# Patient Record
Sex: FEMALE | Race: BLACK OR AFRICAN AMERICAN | Employment: UNEMPLOYED | URBAN - METROPOLITAN AREA
[De-identification: names, ages, dates, MRNs, and addresses within clinical notes are randomized per-mention and may not be internally consistent; named-entity substitution may affect disease eponyms.]

---

## 2017-02-22 ENCOUNTER — OP HISTORICAL/CONVERTED ENCOUNTER (OUTPATIENT)
Dept: OTHER | Age: 72
End: 2017-02-22

## 2018-03-11 ENCOUNTER — ED HISTORICAL/CONVERTED ENCOUNTER (OUTPATIENT)
Dept: OTHER | Age: 73
End: 2018-03-11

## 2019-04-19 ENCOUNTER — ED HISTORICAL/CONVERTED ENCOUNTER (OUTPATIENT)
Dept: OTHER | Age: 74
End: 2019-04-19

## 2020-05-01 ENCOUNTER — ED HISTORICAL/CONVERTED ENCOUNTER (OUTPATIENT)
Dept: OTHER | Age: 75
End: 2020-05-01

## 2022-01-14 ENCOUNTER — HOSPITAL ENCOUNTER (EMERGENCY)
Age: 77
Discharge: HOME | End: 2022-01-14
Attending: EMERGENCY MEDICINE
Payer: MEDICARE

## 2022-01-14 VITALS
TEMPERATURE: 98.5 F | BODY MASS INDEX: 35.44 KG/M2 | RESPIRATION RATE: 18 BRPM | DIASTOLIC BLOOD PRESSURE: 85 MMHG | HEIGHT: 63 IN | WEIGHT: 200 LBS | OXYGEN SATURATION: 99 % | HEART RATE: 62 BPM | SYSTOLIC BLOOD PRESSURE: 140 MMHG

## 2022-01-14 DIAGNOSIS — S16.1XXA STRAIN OF NECK MUSCLE, INITIAL ENCOUNTER: Primary | ICD-10-CM

## 2022-01-14 PROCEDURE — 99284 EMERGENCY DEPT VISIT MOD MDM: CPT

## 2022-01-14 PROCEDURE — 74011000250 HC RX REV CODE- 250: Performed by: EMERGENCY MEDICINE

## 2022-01-14 PROCEDURE — 74011250637 HC RX REV CODE- 250/637: Performed by: EMERGENCY MEDICINE

## 2022-01-14 RX ORDER — IBUPROFEN 800 MG/1
800 TABLET ORAL ONCE
Status: COMPLETED | OUTPATIENT
Start: 2022-01-14 | End: 2022-01-14

## 2022-01-14 RX ORDER — EZETIMIBE 10 MG/1
TABLET ORAL
COMMUNITY

## 2022-01-14 RX ORDER — SIMVASTATIN 10 MG/1
TABLET, FILM COATED ORAL
COMMUNITY

## 2022-01-14 RX ORDER — NAPROXEN 500 MG/1
500 TABLET ORAL 2 TIMES DAILY WITH MEALS
Qty: 20 TABLET | Refills: 0 | Status: SHIPPED | OUTPATIENT
Start: 2022-01-14 | End: 2022-01-24

## 2022-01-14 RX ORDER — LIDOCAINE 4 G/100G
1 PATCH TOPICAL EVERY 24 HOURS
Status: DISCONTINUED | OUTPATIENT
Start: 2022-01-14 | End: 2022-01-14 | Stop reason: HOSPADM

## 2022-01-14 RX ORDER — CETIRIZINE HCL 10 MG
TABLET ORAL
COMMUNITY

## 2022-01-14 RX ADMIN — IBUPROFEN 800 MG: 800 TABLET ORAL at 18:22

## 2022-01-14 NOTE — ED PROVIDER NOTES
EMERGENCY DEPARTMENT HISTORY AND PHYSICAL EXAM      Date: 1/14/2022  Patient Name: Marcelino Scott    History of Presenting Illness     Chief Complaint   Patient presents with    Neck Pain       History Provided By: Patient    HPI: Marcelino Scott, 78 y.o. female with a past medical history significant hypertension and hyperlipidemia presents to the ED with cc of left-sided neck pain. Patient states the last few days she has noticed some sinus pressure and nasal congestion. She states in the past she has been told that if this happens she need a  antibiotic. She has noticed for the last 2 days with a left lateral neck pain that radiates down to her shoulder. The pain feels like a muscle strain. She has not noticed any neurovascular changes or weakness. Tried a dose of Tylenol with some improvement. Patient does do some heavy lifting at work. She tried heat with some improvement last night. She denies any falls or trauma. There are no other complaints, changes, or physical findings at this time. PCP: Conor Gale NP    No current facility-administered medications on file prior to encounter. Current Outpatient Medications on File Prior to Encounter   Medication Sig Dispense Refill    cetirizine (ZYRTEC) 10 mg tablet Take  by mouth.  simvastatin (ZOCOR) 10 mg tablet Take  by mouth nightly.  ezetimibe (Zetia) 10 mg tablet Take  by mouth. Past History     Past Medical History:  Past Medical History:   Diagnosis Date    High cholesterol     Hypertension        Past Surgical History:  History reviewed. No pertinent surgical history. Family History:  History reviewed. No pertinent family history. Social History:  Social History     Tobacco Use    Smoking status: Never Smoker    Smokeless tobacco: Never Used   Substance Use Topics    Alcohol use: Not on file    Drug use: Not on file       Allergies:   Allergies   Allergen Reactions    Tramadol Nausea and Vomiting Review of Systems     Review of Systems   Constitutional: Negative for chills. HENT: Positive for congestion. Respiratory: Negative for cough. Cardiovascular: Negative for chest pain. Gastrointestinal: Negative for constipation, diarrhea and vomiting. Musculoskeletal: Positive for neck pain. Negative for back pain. Skin: Negative for rash. Neurological: Negative for headaches. Psychiatric/Behavioral: The patient is not nervous/anxious. Physical Exam     Physical Exam  Vitals and nursing note reviewed. Constitutional:       Appearance: Normal appearance. She is normal weight. HENT:      Head: Normocephalic and atraumatic. Right Ear: Tympanic membrane normal.      Left Ear: Tympanic membrane normal.      Nose: Nose normal.      Mouth/Throat:      Mouth: Mucous membranes are moist.   Eyes:      Conjunctiva/sclera: Conjunctivae normal.   Neck:      Comments: No point spinal tenderness. Mild tenderness to palpation of the left lateral paraspinal muscles to the shoulder. No meningismus. Full range of motion. Cardiovascular:      Rate and Rhythm: Normal rate. Pulses: Normal pulses. Pulmonary:      Effort: Pulmonary effort is normal. No respiratory distress. Musculoskeletal:         General: No swelling or deformity. Normal range of motion. Skin:     General: Skin is warm and dry. Findings: No rash. Neurological:      General: No focal deficit present. Mental Status: She is alert. Psychiatric:         Mood and Affect: Mood normal.         Behavior: Behavior normal.         Lab and Diagnostic Study Results     Labs -   No results found for this or any previous visit (from the past 12 hour(s)). Radiologic Studies -   @lastxrresult@  CT Results  (Last 48 hours)    None        CXR Results  (Last 48 hours)    None            Medical Decision Making   - I am the first provider for this patient.     - I reviewed the vital signs, available nursing notes, past medical history, past surgical history, family history and social history. - Initial assessment performed. The patients presenting problems have been discussed, and they are in agreement with the care plan formulated and outlined with them. I have encouraged them to ask questions as they arise throughout their visit. Vital Signs-Reviewed the patient's vital signs. Patient Vitals for the past 12 hrs:   Temp Pulse Resp BP SpO2   01/14/22 1609 98.5 °F (36.9 °C)       01/14/22 1608  62 18 (!) 140/85 99 %       Records Reviewed: Nursing Notes      ED Course:        62-year-old female presents for evaluation of left-sided neck pain. Of note patient has had some URI symptoms over the last 2 weeks which are resolving. On exam she is well-appearing. TMs are normal.  This is a mild nasal congestion. This is most likely related to a resolving viral illness. For the last few days she has had some left lateral neck pain consistent with a cervical strain. She has full range of motion without meningismus. Does have some mild paraspinal tenderness to palpation but no point spinal tenderness and no history of trauma. We will treat symptomatically with lidocaine patch, naproxen and have patient follow-up with her PCP. Patient states her PCP recently retired so I gave her contact information for several different primary care physicians to call to get an appointment. Patient understands and agrees with this plan. Discussed return cautions. Discharged home. .        Disposition   Disposition: DC- Adult Discharges: All of the diagnostic tests were reviewed and questions answered. Diagnosis, care plan and treatment options were discussed. The patient understands the instructions and will follow up as directed. The patients results have been reviewed with them. They have been counseled regarding their diagnosis.   The patient verbally convey understanding and agreement of the signs, symptoms, diagnosis, treatment and prognosis and additionally agrees to follow up as recommended with their PCP in 24 - 48 hours. They also agree with the care-plan and convey that all of their questions have been answered. I have also put together some discharge instructions for them that include: 1) educational information regarding their diagnosis, 2) how to care for their diagnosis at home, as well a 3) list of reasons why they would want to return to the ED prior to their follow-up appointment, should their condition change. Discharged    DISCHARGE PLAN:  1. Current Discharge Medication List      CONTINUE these medications which have NOT CHANGED    Details   cetirizine (ZYRTEC) 10 mg tablet Take  by mouth. simvastatin (ZOCOR) 10 mg tablet Take  by mouth nightly.      ezetimibe (Zetia) 10 mg tablet Take  by mouth. 2.   Follow-up Information     Follow up With Specialties Details Why Contact Info    Emanuel Amador MD Family Medicine  As needed [address]  [phone]      Minal Wadsworth NP Nurse Practitioner  As needed [address] [facility]  [phone]      [address]   As needed [address] [address]  [phone]    [address] [facility]  As needed [address] [facility]  [phone]        3. Return to ED if worse   4. Current Discharge Medication List      START taking these medications    Details   naproxen (Naprosyn) 500 mg tablet Take 1 Tablet by mouth two (2) times daily (with meals) for 10 days. Qty: 20 Tablet, Refills: 0  Start date: 1/14/2022, End date: 1/24/2022               Diagnosis     Clinical Impression:   1. Strain of neck muscle, initial encounter        Attestations:    Rashawn Keating MD    Please note that this dictation was completed with [employer], the [employer] voice recognition software.   Quite often unanticipated grammatical, syntax, homophones, and other interpretive errors are inadvertently transcribed by the computer software. Please disregard these errors. Please excuse any errors that have escaped final proofreading. Thank you.

## 2022-01-14 NOTE — ED NOTES
Pt discharge at this time. D/C instructions reviewed by this nurse and Pt verbalized understanding. Medications reviewed and pharmacy confirmed. Answers all questions to the best of my ability.

## 2022-01-14 NOTE — Clinical Note
[address] EMERGENCY DEPARTMENT  [address] [facility] [address]  [phone]    Work/School Note    Date: 1/14/2022    To Whom It May concern:    Candie Lares was seen and treated today in the emergency room by the following provider(s):  Attending Provider: Nanda Scott MD.      Candie Lares is excused from work/school on 1/14/2022 through 1/16/2022. She is medically clear to return to work/school on 1/17/2022.          Sincerely,          Daylin Beal MD

## 2023-01-19 ENCOUNTER — HOSPITAL ENCOUNTER (EMERGENCY)
Age: 78
Discharge: HOME | End: 2023-01-19
Payer: MEDICARE

## 2023-01-19 ENCOUNTER — APPOINTMENT (OUTPATIENT)
Dept: GENERAL RADIOLOGY | Age: 78
End: 2023-01-19
Attending: EMERGENCY MEDICINE
Payer: MEDICARE

## 2023-01-19 VITALS
HEIGHT: 63 IN | TEMPERATURE: 98.4 F | OXYGEN SATURATION: 100 % | BODY MASS INDEX: 35.44 KG/M2 | DIASTOLIC BLOOD PRESSURE: 70 MMHG | WEIGHT: 200 LBS | HEART RATE: 66 BPM | SYSTOLIC BLOOD PRESSURE: 132 MMHG | RESPIRATION RATE: 18 BRPM

## 2023-01-19 DIAGNOSIS — M25.532 LEFT WRIST PAIN: Primary | ICD-10-CM

## 2023-01-19 DIAGNOSIS — M19.042 OSTEOARTHRITIS OF LEFT HAND, UNSPECIFIED OSTEOARTHRITIS TYPE: ICD-10-CM

## 2023-01-19 PROCEDURE — 99283 EMERGENCY DEPT VISIT LOW MDM: CPT

## 2023-01-19 PROCEDURE — 73110 X-RAY EXAM OF WRIST: CPT

## 2023-01-19 PROCEDURE — 74011636637 HC RX REV CODE- 636/637: Performed by: NURSE PRACTITIONER

## 2023-01-19 RX ORDER — METHYLPREDNISOLONE 4 MG/1
TABLET ORAL
Qty: 1 DOSE PACK | Refills: 0 | Status: SHIPPED | OUTPATIENT
Start: 2023-01-19

## 2023-01-19 RX ORDER — ACETAMINOPHEN 500 MG
1000 TABLET ORAL
Qty: 20 TABLET | Refills: 0 | Status: SHIPPED | OUTPATIENT
Start: 2023-01-19 | End: 2023-01-19 | Stop reason: SDUPTHER

## 2023-01-19 RX ORDER — ACETAMINOPHEN 500 MG
1000 TABLET ORAL
Qty: 20 TABLET | Refills: 0 | Status: SHIPPED | OUTPATIENT
Start: 2023-01-19

## 2023-01-19 RX ORDER — METHYLPREDNISOLONE 4 MG/1
TABLET ORAL
Qty: 1 DOSE PACK | Refills: 0 | Status: SHIPPED | OUTPATIENT
Start: 2023-01-19 | End: 2023-01-19 | Stop reason: SDUPTHER

## 2023-01-19 RX ORDER — PREDNISONE 20 MG/1
60 TABLET ORAL
Status: COMPLETED | OUTPATIENT
Start: 2023-01-19 | End: 2023-01-19

## 2023-01-19 RX ADMIN — PREDNISONE 60 MG: 20 TABLET ORAL at 18:19

## 2023-01-19 NOTE — ED PROVIDER NOTES
[facility] EMERGENCY DEPARTMENT  EMERGENCY DEPARTMENT HISTORY AND PHYSICAL EXAM      Date: 1/19/2023  Patient Name: Garrison Quiñonez  MRN: [id number]  [place]: 1945  Date of evaluation: 1/19/2023  Provider: Armando Granger NP   Note Started: 6:09 PM 1/19/23    HISTORY OF PRESENT ILLNESS     Chief Complaint   Patient presents with    Hand Pain       History Provided By: Patient    HPI: Garrison Quiñonez, 78 y.o. female htn, hl complains of left wrist pain and swelling for the past week. She reports use of Tylenol Motrin with no improvement in symptoms. Reports aggravating factors include movement and palpation. Works as a CNA. She denies any known injury, trauma, fever, chills, erythema, warmth, drainage laceration or abrasion numbness or tingling. PAST MEDICAL HISTORY   Past Medical History:  Past Medical History:   Diagnosis Date    High cholesterol     Hypertension        Past Surgical History:  No past surgical history on file. Family History:  History reviewed. No pertinent family history. Social History:  Social History     Tobacco Use    Smoking status: Never    Smokeless tobacco: Never       Allergies: Allergies   Allergen Reactions    Tramadol Nausea and Vomiting       PCP: Malick Quarles NP    Current Meds:   Previous Medications    CETIRIZINE (ZYRTEC) 10 MG TABLET    Take  by mouth. EZETIMIBE (ZETIA) 10 MG TABLET    Take  by mouth. SIMVASTATIN (ZOCOR) 10 MG TABLET    Take  by mouth nightly. REVIEW OF SYSTEMS   Review of Systems   Constitutional:  Negative for chills and fever. Musculoskeletal:  Positive for arthralgias and joint swelling. Skin:  Negative for color change, rash and wound. Neurological:  Negative for weakness and numbness. All other systems reviewed and are negative. Positives and Pertinent negatives as per HPI.     PHYSICAL EXAM     ED Triage Vitals [01/19/23 1637]   ED Encounter Vitals Group      /70      Pulse (Heart Rate) 66      Resp Rate 18 Temp 98.4 °F (36.9 °C)      Temp src       O2 Sat (%) 100 %      Weight 200 lb      Height 5' 3\"      Physical Exam  Vitals and nursing note reviewed. Constitutional:       General: She is not in acute distress. Appearance: Normal appearance. She is normal weight. She is not ill-appearing or toxic-appearing. HENT:      Head: Normocephalic and atraumatic. Nose: Nose normal.      Mouth/Throat:      Mouth: Mucous membranes are moist.   Eyes:      Extraocular Movements: Extraocular movements intact. Cardiovascular:      Rate and Rhythm: Normal rate and regular rhythm. Pulses: Normal pulses. Pulmonary:      Effort: Pulmonary effort is normal. No respiratory distress. Musculoskeletal:         General: Swelling and tenderness present. Left wrist: Swelling and tenderness present. No bony tenderness or snuff box tenderness. Decreased range of motion. Normal pulse. Cervical back: Normal range of motion and neck supple. No rigidity. Comments: 2+ distal pulses, less than 2-second capillary refill, positive sensation bilaterally. No erythema edema warmth drainage or laceration noted to site. Mild edema noted to left wrist with tenderness to palpation and flexion and extension of left wrist.  Patient neurovascularly intact   Skin:     General: Skin is warm and dry. Capillary Refill: Capillary refill takes less than 2 seconds. Neurological:      Mental Status: She is alert and oriented to person, place, and time. SCREENINGS               No data recorded        LAB, EKG AND DIAGNOSTIC RESULTS       Radiologic Studies:      Interpretation per the Radiologist below, if available at the time of this note:  XR WRIST LT AP/LAT/OBL MIN 3V    Result Date: 1/19/2023  EXAM: XR WRIST LT AP/LAT/OBL MIN 3V INDICATION:  left wrist pain COMPARISON: None. FINDINGS: Three views of the left wrist demonstrate diffusely decreased bone mineralization.  There is no evident fracture or other osseous, articular or soft tissue abnormality. Osteoarthritic changes are seen most severe at the base of the thumb. No fracture. Diffuse osteopenia and osteoarthritis. Consider follow-up or CT for continue/persistent clinical concern. PROCEDURES   Unless otherwise noted below, none. Performed by: Shamir Art NP   Procedures      CRITICAL CARE TIME       ED COURSE and DIFFERENTIAL DIAGNOSIS/MDM   Vitals:    Vitals:    01/19/23 1637   BP: 132/70   Pulse: 66   Resp: 18   Temp: 98.4 °F (36.9 °C)   SpO2: 100%   Weight: 90.7 kg (200 lb)   Height: 5' 3\" (1.6 m)        Patient was given the following medications:  Medications   predniSONE (DELTASONE) tablet 60 mg (has no administration in time range)     79-year-old female patient complains of left hand pain. Vital signs shows patient is afebrile not tachycardic SPO2 100% on room air. And physical exam grossly negative except for mild swelling decreased ROM to left wrist.  According to HPI differential diagnoses include septic arthritis, OA, gout, joint effusion, sprain. X-ray shows no acute findings however patient with osteopenia and osteoporosis. She reports no improvement status post Tylenol took some ibuprofen however concerned about use at this time. Patient with decreased ROM and tenderness with flexion and extension of left wrist trace edema noted. We will start patient on short course of p.o. prednisone advised to follow-up with orthopedic and PCP supportive care of Ace wrap given for stability and support for gout due to no erythema or pain out of proportion noted on exam denies any injury or trauma no sprain no joint effusion noted on exam patient is afebrile not tachycardic no warmth erythema or drainage noted low concern for septic arthritis. Patient advised signs symptoms to return to emergency department verbalized understanding stable at time of discharge    FINAL IMPRESSION     1. Left wrist pain    2.  Osteoarthritis of left hand, unspecified osteoarthritis type          DISPOSITION/PLAN   Discharged    Discharge Note: The patient is stable for discharge home. The signs, symptoms, diagnosis, and discharge instructions have been discussed, understanding conveyed, and agreed upon. The patient is to follow up as recommended or return to ER should their symptoms worsen. PATIENT REFERRED TO:  Follow-up Information       Follow up With Specialties Details Why Contact Info    Aydee Bravo NP Nurse Practitioner Schedule an appointment as soon as possible for a visit in 2 days If symptoms worsen, As needed [address] [facility]      Katie Verma MD Orthopedic Surgery Schedule an appointment as soon as possible for a visit in 1 week If symptoms worsen, As needed [facility]  [facility]  [phone]                DISCHARGE MEDICATIONS:  Current Discharge Medication List        START taking these medications    Details   methylPREDNISolone (Medrol, Pa,) 4 mg tablet Take as directed  Qty: 1 Dose Pack, Refills: 0  Start date: 1/19/2023      acetaminophen (Acetaminophen Extra Strength) 500 mg tablet Take 2 Tablets by mouth every six (6) hours as needed for Pain or Fever. Qty: 20 Tablet, Refills: 0  Start date: 1/19/2023               DISCONTINUED MEDICATIONS:  Current Discharge Medication List          I am the Primary Clinician of Record: Kandy Schilling NP (electronically signed)    (Please note that parts of this dictation were completed with voice recognition software. Quite often unanticipated grammatical, syntax, homophones, and other interpretive errors are inadvertently transcribed by the computer software. Please disregards these errors.  Please excuse any errors that have escaped final proofreading.)

## 2023-01-19 NOTE — DISCHARGE INSTRUCTIONS
Thank you! Thank you for allowing me to care for you in the emergency department. It is my goal to provide you with excellent care. If you have not received excellent quality care, please ask to speak to the nurse manager. Please fill out the survey that will come to you by mail or email since we listen to your feedback! Below you will find a list of your tests from today's visit. Should you have any questions, please do not hesitate to call the emergency department. Labs  No results found for this or any previous visit (from the past 12 hour(s)). Radiologic Studies  XR WRIST LT AP/LAT/OBL MIN 3V   Final Result   No fracture. Diffuse osteopenia and osteoarthritis. Consider   follow-up or CT for continue/persistent clinical concern. CT Results  (Last 48 hours)      None          CXR Results  (Last 48 hours)      None          ------------------------------------------------------------------------------------------------------------  The exam and treatment you received in the Emergency Department were for an urgent problem and are not intended as complete care. It is important that you follow-up with a doctor, nurse practitioner, or physician assistant to:  (1) confirm your diagnosis,  (2) re-evaluation of changes in your illness and treatment, and  (3) for ongoing care. Please take your discharge instructions with you when you go to your follow-up appointment. If you have any problem arranging a follow-up appointment, contact the Emergency Department. If your symptoms become worse or you do not improve as expected and you are unable to reach your health care provider, please return to the Emergency Department. We are available 24 hours a day. If a prescription has been provided, please have it filled as soon as possible to prevent a delay in treatment.  If you have any questions or reservations about taking the medication due to side effects or interactions with other medications, please call your primary care provider or contact the ER.

## 2023-01-19 NOTE — Clinical Note
[address] EMERGENCY DEPARTMENT  [address] [facility] [address]  [phone]    Work/School Note    Date: 1/19/2023    To Whom It May concern:      Kiki Seals was seen and treated today in the emergency room by the following provider(s):  Nurse Practitioner: Guadlupe Severs, NP. Kiki Seals is excused from work/school on 01/19/23. She is clear to return to work/school on 01/20/23.         Sincerely,          Yi De La Cruz NP

## 2023-08-28 ENCOUNTER — HOSPITAL ENCOUNTER (EMERGENCY)
Facility: HOSPITAL | Age: 78
Discharge: HOME | End: 2023-08-28
Attending: FAMILY MEDICINE
Payer: MEDICARE

## 2023-08-28 VITALS
HEIGHT: 64 IN | SYSTOLIC BLOOD PRESSURE: 125 MMHG | WEIGHT: 205 LBS | OXYGEN SATURATION: 100 % | DIASTOLIC BLOOD PRESSURE: 77 MMHG | HEART RATE: 77 BPM | BODY MASS INDEX: 35 KG/M2 | TEMPERATURE: 98.3 F | RESPIRATION RATE: 17 BRPM

## 2023-08-28 DIAGNOSIS — L02.811 ABSCESS OF SCALP: Primary | ICD-10-CM

## 2023-08-28 PROCEDURE — 99283 EMERGENCY DEPT VISIT LOW MDM: CPT

## 2023-08-28 PROCEDURE — 6370000000 HC RX 637 (ALT 250 FOR IP): Performed by: FAMILY MEDICINE

## 2023-08-28 RX ORDER — DOXYCYCLINE HYCLATE 100 MG/1
100 CAPSULE ORAL 2 TIMES DAILY
Qty: 14 CAPSULE | Refills: 0 | Status: SHIPPED | OUTPATIENT
Start: 2023-08-28 | End: 2023-09-04

## 2023-08-28 RX ORDER — IBUPROFEN 400 MG/1
400 TABLET ORAL
Status: COMPLETED | OUTPATIENT
Start: 2023-08-28 | End: 2023-08-28

## 2023-08-28 RX ORDER — CEPHALEXIN 500 MG/1
500 CAPSULE ORAL 4 TIMES DAILY
Qty: 28 CAPSULE | Refills: 0 | Status: SHIPPED | OUTPATIENT
Start: 2023-08-28 | End: 2023-09-04

## 2023-08-28 RX ADMIN — IBUPROFEN 400 MG: 400 TABLET, FILM COATED ORAL at 18:04

## 2023-08-28 ASSESSMENT — PAIN - FUNCTIONAL ASSESSMENT: PAIN_FUNCTIONAL_ASSESSMENT: 0-10

## 2023-08-28 ASSESSMENT — LIFESTYLE VARIABLES
HOW OFTEN DO YOU HAVE A DRINK CONTAINING ALCOHOL: NEVER
HOW MANY STANDARD DRINKS CONTAINING ALCOHOL DO YOU HAVE ON A TYPICAL DAY: PATIENT DOES NOT DRINK

## 2023-08-28 ASSESSMENT — PAIN SCALES - GENERAL: PAINLEVEL_OUTOF10: 7

## 2023-08-28 NOTE — ED PROVIDER NOTES
(source and summary of external notes): Nursing notes           ED Course/ Provider Notes (Medical Decision Making):     Patient presented to the emergency department with the aforementioned chief complaint. On examination the patient is nontoxic. Vitals were reviewed per above. Findings consistent with spontaneously drained abscess. There is some surrounding induration. Will cover with antibiotics. No need for additional incision and drainage at this time. Carmine Asif was given a thorough list of signs and symptoms that would warrant an immediate return to the emergency department. Otherwise Carmine Asif will follow up with PCP. Procedures   Medical Decision Makingedical Decision Making  Performed by: Richard Brown,   Procedures  None       Disposition   Disposition:     Home     All of the diagnostic tests were reviewed and questions answered. Diagnosis, care plan and treatment options were discussed. The patient understands the instructions and will follow up as directed. The patients results have been reviewed with them. They have been counseled regarding their diagnosis. The patient verbally convey understanding and agreement of the signs, symptoms, diagnosis, treatment and prognosis and additionally agrees to follow up as recommended with their PCP in 24 - 48 hours. They also agree with the care-plan and convey that all of their questions have been answered. I have also put together some discharge instructions for them that include: 1) educational information regarding their diagnosis, 2) how to care for their diagnosis at home, as well a 3) list of reasons why they would want to return to the ED prior to their follow-up appointment, should their condition change. DISCHARGE PLAN:    1.    Current Discharge Medication List        START taking these medications    Details   cephALEXin (KEFLEX) 500 MG capsule Take 1 capsule by mouth 4 times daily for 7 days  Qty: 28 capsule,

## 2023-08-28 NOTE — ED TRIAGE NOTES
Pt presents with a \"bump\" to the back of the head. Pt has put warm compresses on it throughout the day which helped it feel better, but now it feels like it is more swollen.

## 2024-10-14 ENCOUNTER — HOSPITAL ENCOUNTER (EMERGENCY)
Facility: HOSPITAL | Age: 79
Discharge: HOME | End: 2024-10-14
Attending: FAMILY MEDICINE
Payer: MEDICARE

## 2024-10-14 ENCOUNTER — APPOINTMENT (OUTPATIENT)
Facility: HOSPITAL | Age: 79
End: 2024-10-14
Payer: MEDICARE

## 2024-10-14 VITALS
TEMPERATURE: 98.1 F | WEIGHT: 190 LBS | HEIGHT: 63 IN | HEART RATE: 64 BPM | RESPIRATION RATE: 20 BRPM | BODY MASS INDEX: 33.66 KG/M2 | DIASTOLIC BLOOD PRESSURE: 63 MMHG | SYSTOLIC BLOOD PRESSURE: 128 MMHG | OXYGEN SATURATION: 97 %

## 2024-10-14 DIAGNOSIS — M25.551 RIGHT HIP PAIN: Primary | ICD-10-CM

## 2024-10-14 DIAGNOSIS — Q78.2 BONY SCLEROSIS: ICD-10-CM

## 2024-10-14 PROCEDURE — 6370000000 HC RX 637 (ALT 250 FOR IP): Performed by: FAMILY MEDICINE

## 2024-10-14 PROCEDURE — 99283 EMERGENCY DEPT VISIT LOW MDM: CPT

## 2024-10-14 PROCEDURE — 73502 X-RAY EXAM HIP UNI 2-3 VIEWS: CPT

## 2024-10-14 RX ORDER — PREDNISONE 20 MG/1
20 TABLET ORAL DAILY
Qty: 5 TABLET | Refills: 0 | Status: SHIPPED | OUTPATIENT
Start: 2024-10-14 | End: 2024-10-14

## 2024-10-14 RX ORDER — SENNOSIDES 8.6 MG
650 CAPSULE ORAL EVERY 8 HOURS PRN
Qty: 12 TABLET | Refills: 0 | Status: SHIPPED | OUTPATIENT
Start: 2024-10-14 | End: 2024-10-18

## 2024-10-14 RX ORDER — SENNOSIDES 8.6 MG
650 CAPSULE ORAL EVERY 8 HOURS PRN
Qty: 12 TABLET | Refills: 0 | Status: SHIPPED | OUTPATIENT
Start: 2024-10-14 | End: 2024-10-14

## 2024-10-14 RX ORDER — PREDNISONE 20 MG/1
20 TABLET ORAL DAILY
Qty: 5 TABLET | Refills: 0 | Status: SHIPPED | OUTPATIENT
Start: 2024-10-14 | End: 2024-10-19

## 2024-10-14 RX ORDER — PREDNISONE 20 MG/1
40 TABLET ORAL
Status: COMPLETED | OUTPATIENT
Start: 2024-10-14 | End: 2024-10-14

## 2024-10-14 RX ADMIN — PREDNISONE 40 MG: 20 TABLET ORAL at 07:44

## 2024-10-14 ASSESSMENT — PAIN - FUNCTIONAL ASSESSMENT: PAIN_FUNCTIONAL_ASSESSMENT: 0-10

## 2024-10-14 ASSESSMENT — PAIN SCALES - GENERAL: PAINLEVEL_OUTOF10: 10

## 2024-10-14 NOTE — DISCHARGE INSTRUCTIONS
Thank you for choosing our Emergency Department for your care.  It is our privilege to care for you in your time of need.  In the next several days, you may receive a survey via email or mailed to your home about your experience with our team.  We would greatly appreciate you taking a few minutes to complete the survey, as we use this information to learn what we have done well and what we could be doing better. Thank you for trusting us with your care!    Below you will find a list of your tests from today's visit.   Labs  No results found for this or any previous visit (from the past 12 hour(s)).    Radiologic Studies  XR HIP 2-3 VW W PELVIS RIGHT   Final Result   1. No acute process. Right hip arthroplasty, intact.    2. Degenerative changes lumbar spine and slight widening with sclerotic   irregularity of the pubic symphysis.      Electronically signed by Rolando Boo        ------------------------------------------------------------------------------------------------------------  The evaluation and treatment you received in the Emergency Department were for an urgent problem. It is important that you follow-up with a doctor, nurse practitioner, or physician assistant to:  (1) confirm your diagnosis,  (2) re-evaluation of changes in your illness and treatment, and (3) for ongoing care. Please take your discharge instructions with you when you go to your follow-up appointment.     If you have any problem arranging a follow-up appointment, contact us!  If your symptoms become worse or you do not improve as expected, please return to us. We are available 24 hours a day.     If a prescription has been provided, please fill it as soon as possible to prevent a delay in treatment. If you have any questions or reservations about taking the medication due to side effects or interactions with other medications, please call your primary care provider or contact us directly.  Again, THANK YOU for choosing us to care

## 2024-10-14 NOTE — ED TRIAGE NOTES
Pt to ed with right hip pain that started this morning. Pt stated that she felt a grabbing pain while getting out of bed.   No

## 2024-10-17 NOTE — ED PROVIDER NOTES
Pulmonary:      Effort: Pulmonary effort is normal. No respiratory distress.      Breath sounds: Normal breath sounds.   Abdominal:      General: Abdomen is flat.      Palpations: Abdomen is soft.      Tenderness: There is no abdominal tenderness. There is no right CVA tenderness, left CVA tenderness or guarding.   Musculoskeletal:         General: No swelling or deformity.      Cervical back: Normal range of motion. No rigidity or tenderness.      Comments: Pain with active range of motion at right hip.  No reproducible tenderness on exam.  No pain with passive range of motion testing.  Dorsalis pedis and posterior tibial pulses 2+.  Extremity well-perfused.  No tenderness of the lower extremities.   Skin:     Coloration: Skin is not jaundiced.      Findings: No rash.   Neurological:      General: No focal deficit present.      Mental Status: She is alert and oriented to person, place, and time.   Psychiatric:         Mood and Affect: Mood normal.         Behavior: Behavior normal.         Lab and Diagnostic Study Results     Labs -   No results found for this or any previous visit (from the past 12 hour(s)).    Radiology:  Interpretation per the Radiologist below, if available at the time of this note:  XR HIP 2-3 VW W PELVIS RIGHT   Final Result   1. No acute process. Right hip arthroplasty, intact.    2. Degenerative changes lumbar spine and slight widening with sclerotic   irregularity of the pubic symphysis.      Electronically signed by Rolando Boo             Medical Decision Making   - I am the primary provider for this patient Ronnie Young DO  - I reviewed the vital signs, available nursing notes, past medical history, past surgical history, family history and social history.  - Initial assessment performed. The patients presenting problems have been discussed, and they are in agreement with the care plan formulated and outlined with them.  I have encouraged them to ask questions as they arise

## 2025-04-10 ENCOUNTER — HOSPITAL ENCOUNTER (EMERGENCY)
Facility: HOSPITAL | Age: 80
Discharge: HOME | End: 2025-04-10
Attending: EMERGENCY MEDICINE
Payer: MEDICARE

## 2025-04-10 ENCOUNTER — APPOINTMENT (OUTPATIENT)
Facility: HOSPITAL | Age: 80
End: 2025-04-10
Payer: MEDICARE

## 2025-04-10 VITALS
TEMPERATURE: 97.7 F | BODY MASS INDEX: 34.55 KG/M2 | RESPIRATION RATE: 16 BRPM | SYSTOLIC BLOOD PRESSURE: 137 MMHG | OXYGEN SATURATION: 100 % | HEIGHT: 63 IN | HEART RATE: 58 BPM | DIASTOLIC BLOOD PRESSURE: 70 MMHG | WEIGHT: 195 LBS

## 2025-04-10 DIAGNOSIS — M53.3 SACROILIAC JOINT PAIN: ICD-10-CM

## 2025-04-10 DIAGNOSIS — M25.551 RIGHT HIP PAIN: Primary | ICD-10-CM

## 2025-04-10 DIAGNOSIS — M54.16 LUMBAR RADICULOPATHY: ICD-10-CM

## 2025-04-10 PROCEDURE — 6370000000 HC RX 637 (ALT 250 FOR IP): Performed by: EMERGENCY MEDICINE

## 2025-04-10 PROCEDURE — 73502 X-RAY EXAM HIP UNI 2-3 VIEWS: CPT

## 2025-04-10 PROCEDURE — 99283 EMERGENCY DEPT VISIT LOW MDM: CPT

## 2025-04-10 RX ORDER — PREDNISONE 20 MG/1
40 TABLET ORAL
Status: COMPLETED | OUTPATIENT
Start: 2025-04-10 | End: 2025-04-10

## 2025-04-10 RX ORDER — METHYLPREDNISOLONE 4 MG/1
TABLET ORAL
Qty: 1 KIT | Refills: 0 | Status: SHIPPED | OUTPATIENT
Start: 2025-04-10

## 2025-04-10 RX ADMIN — PREDNISONE 40 MG: 20 TABLET ORAL at 07:35

## 2025-04-10 ASSESSMENT — PAIN SCALES - GENERAL: PAINLEVEL_OUTOF10: 10

## 2025-04-10 ASSESSMENT — PAIN - FUNCTIONAL ASSESSMENT: PAIN_FUNCTIONAL_ASSESSMENT: 0-10

## 2025-04-10 NOTE — ED TRIAGE NOTES
Pt to ed with right hip pain that started yesterday morning. Pt denies any injury/fall to her hip. Pt took 2 Tylenol pta.

## 2025-04-10 NOTE — ED PROVIDER NOTES
[facility] EMERGENCY DEPT  EMERGENCY DEPARTMENT HISTORY AND PHYSICAL EXAM      Date of evaluation: 4/10/2025  Patient Name: Jennifer Fregoso  Birthdate 1945  MRN: [id number]  ED Provider: Scarlett Giraldo MD   Note Started: 7:06 AM EDT 4/10/25    HISTORY OF PRESENT ILLNESS     Chief Complaint   Patient presents with   • Hip Pain       History Provided By: Patient, only     HPI: Jennifer Fregoso is a 79 y.o. female presents ambulatory to the ED with right hip pain that began yesterday without trauma, fall or injury. Pain is grabbing in nature especially when she gets up to walk. Pt took Tylenol with good relief yesterday but pain worsened at work and she had to take more Tylenol. Pt awoke with pain again today and decided to come to the ED. She has had this before and was treated with prednisone which resolved problem. Pt denies leg weakness, bowel or bladder dysfunction and denies saddle anesthesia. She denies sciatica.    PAST MEDICAL HISTORY   Past Medical History:  Past Medical History:   Diagnosis Date   • High cholesterol    • Hypertension        Past Surgical History:  Past Surgical History:   Procedure Laterality Date   • HIP SURGERY Right     right hip replacement in 2010       Family History:  History reviewed. No pertinent family history.    Social History:  Social History     Tobacco Use   • Smoking status: Never   • Smokeless tobacco: Never       Allergies:  Allergies   Allergen Reactions   • Tramadol Nausea And Vomiting       PCP: Audrey Shankar MD    Current Meds:   No current facility-administered medications for this encounter.     Current Outpatient Medications   Medication Sig Dispense Refill   • acetaminophen (TYLENOL 8 HOUR) 650 MG extended release tablet Take 1 tablet by mouth every 8 hours as needed for Pain 12 tablet 0   • cetirizine (ZYRTEC) 10 MG tablet Take by mouth     • ezetimibe (ZETIA) 10 MG tablet Take by mouth     • methylPREDNISolone (MEDROL DOSEPACK) 4 MG tablet Take as directed    findings: Not Applicable..     Interpretation per the Radiologist below, if available at the time of this note:  XR HIP 2-3 VW W PELVIS RIGHT   Final Result   No acute fracture. Right hip arthroplasty.      Electronically signed by Rolando Boo         ED COURSE and DIFFERENTIAL DIAGNOSIS/MDM   7:06 AM Differential and Considerations of tests not ordered: Remote hip arthroplasty, no signs of acute trauma, no red flags, with acute exacerbation of lumbar radiculopathy, right SI joint pain that will treat with Prednisone at this time. She was referred back to her Orthopedist if pain does not improve with treatment provided.    Records Reviewed: Prior medical records and Nursing notes. See ED Course for summary.   Vitals:    Vitals:    04/10/25 0641   BP: 137/70   Pulse: (!) 42   Resp: 16   Temp: 97.7 °F (36.5 °C)   TempSrc: Oral   SpO2: 100%   Weight: 88.5 kg (195 lb)   Height: 1.6 m (5' 3\")        ED COURSE  ED Course as of 04/10/25 1915   Thu Apr 10, 2025   0746 Pt's HR reassessed and was found to be 58. Per pt she is followed by cardiology for low heart rate and this is consistent for her. [LG]      ED Course User Index  [LG] Scarlett Giraldo MD       Clinical Management Tools:  Not Applicable    Smoking Cessation: Not Applicable    Patient was given the following medications:  Medications - No data to display    CONSULTS: See ED Course/MDM for further details.  None   PROCEDURES   Unless otherwise noted above, none  Procedures    SEPSIS REASSESSMENT & CRITICAL CARE TIME   SEPSIS REASSESSMENT: Patient does NOT meet Sepsis criteria after ED workup    Patient does not meet Critical Care Time, 0 minutes  CLINICAL IMPRESSIONS     1. Right hip pain    2. Sacroiliac joint pain    3. Lumbar radiculopathy       SDOH/DISPOSITION/PLAN   Social Determinants affecting Treatment Plan: None    DISPOSITION               Discharge Note: The patient is stable for discharge home. The signs, symptoms, diagnosis, and discharge

## 2025-04-10 NOTE — DISCHARGE INSTRUCTIONS
Thank you for choosing our Emergency Department for your care.  It is our privilege to care for you in your time of need.  In the next several days, you may receive a survey via email or mailed to your home about your experience with our team.  We would greatly appreciate you taking a few minutes to complete the survey, as we use this information to learn what we have done well and what we could be doing better. Thank you for trusting us with your care!    Below you will find a list of your tests from today's visit.   Labs and Radiology Studies  No results found for this or any previous visit (from the past 12 hours).  XR HIP 2-3 VW W PELVIS RIGHT  Result Date: 4/10/2025  INDICATION: R hip pain COMPARISON: October 14, 2024 FINDINGS: Single AP view of the pelvis and lateral view of the right hip demonstrate no acute fracture or dislocation. Intact right hip arthroplasty. Unchanged sclerosis of the pubic symphysis with slight widening. The sacroiliac joints are intact.     No acute fracture. Right hip arthroplasty. Electronically signed by Rolando Boo    ------------------------------------------------------------------------------------------------------------  The evaluation and treatment you received in the Emergency Department were for an urgent problem. It is important that you follow-up with a doctor, nurse practitioner, or physician assistant to:  (1) confirm your diagnosis,  (2) re-evaluation of changes in your illness and treatment, and (3) for ongoing care. Please take your discharge instructions with you when you go to your follow-up appointment.     If you have any problem arranging a follow-up appointment, contact us!  If your symptoms become worse or you do not improve as expected, please return to us. We are available 24 hours a day.     If a prescription has been provided, please fill it as soon as possible to prevent a delay in treatment. If you have any questions or reservations about taking the